# Patient Record
Sex: MALE | Race: OTHER | HISPANIC OR LATINO | Employment: OTHER | URBAN - METROPOLITAN AREA
[De-identification: names, ages, dates, MRNs, and addresses within clinical notes are randomized per-mention and may not be internally consistent; named-entity substitution may affect disease eponyms.]

---

## 2020-09-09 ENCOUNTER — HOSPITAL ENCOUNTER (OUTPATIENT)
Facility: HOSPITAL | Age: 55
Setting detail: OBSERVATION
Discharge: HOME | End: 2020-09-10
Attending: EMERGENCY MEDICINE | Admitting: INTERNAL MEDICINE
Payer: MEDICAID

## 2020-09-09 ENCOUNTER — APPOINTMENT (EMERGENCY)
Dept: RADIOLOGY | Facility: HOSPITAL | Age: 55
End: 2020-09-09
Payer: MEDICAID

## 2020-09-09 DIAGNOSIS — R55 SYNCOPE: ICD-10-CM

## 2020-09-09 DIAGNOSIS — R07.9 CHEST PAIN: Primary | ICD-10-CM

## 2020-09-09 DIAGNOSIS — E86.0 DEHYDRATION: ICD-10-CM

## 2020-09-09 DIAGNOSIS — N17.9 AKI (ACUTE KIDNEY INJURY) (HCC): ICD-10-CM

## 2020-09-09 PROBLEM — R07.89 OTHER CHEST PAIN: Status: ACTIVE | Noted: 2020-09-09

## 2020-09-09 LAB
ALBUMIN SERPL BCP-MCNC: 4.4 G/DL (ref 3.5–5)
ALP SERPL-CCNC: 63 U/L (ref 46–116)
ALT SERPL W P-5'-P-CCNC: 40 U/L (ref 12–78)
ANION GAP SERPL CALCULATED.3IONS-SCNC: 12 MMOL/L (ref 4–13)
AST SERPL W P-5'-P-CCNC: 22 U/L (ref 5–45)
BASOPHILS # BLD AUTO: 0.07 THOUSANDS/ΜL (ref 0–0.1)
BASOPHILS NFR BLD AUTO: 1 % (ref 0–1)
BILIRUB SERPL-MCNC: 0.7 MG/DL (ref 0.2–1)
BUN SERPL-MCNC: 14 MG/DL (ref 5–25)
CALCIUM SERPL-MCNC: 9.6 MG/DL (ref 8.3–10.1)
CHLORIDE SERPL-SCNC: 107 MMOL/L (ref 100–108)
CO2 SERPL-SCNC: 26 MMOL/L (ref 21–32)
CREAT SERPL-MCNC: 2.21 MG/DL (ref 0.6–1.3)
EOSINOPHIL # BLD AUTO: 0.06 THOUSAND/ΜL (ref 0–0.61)
EOSINOPHIL NFR BLD AUTO: 0 % (ref 0–6)
ERYTHROCYTE [DISTWIDTH] IN BLOOD BY AUTOMATED COUNT: 12.6 % (ref 11.6–15.1)
GFR SERPL CREATININE-BSD FRML MDRD: 33 ML/MIN/1.73SQ M
GLUCOSE SERPL-MCNC: 109 MG/DL (ref 65–140)
HCT VFR BLD AUTO: 43 % (ref 36.5–49.3)
HGB BLD-MCNC: 14.4 G/DL (ref 12–17)
IMM GRANULOCYTES # BLD AUTO: 0.1 THOUSAND/UL (ref 0–0.2)
IMM GRANULOCYTES NFR BLD AUTO: 1 % (ref 0–2)
LYMPHOCYTES # BLD AUTO: 1.51 THOUSANDS/ΜL (ref 0.6–4.47)
LYMPHOCYTES NFR BLD AUTO: 10 % (ref 14–44)
MCH RBC QN AUTO: 29.4 PG (ref 26.8–34.3)
MCHC RBC AUTO-ENTMCNC: 33.5 G/DL (ref 31.4–37.4)
MCV RBC AUTO: 88 FL (ref 82–98)
MONOCYTES # BLD AUTO: 0.76 THOUSAND/ΜL (ref 0.17–1.22)
MONOCYTES NFR BLD AUTO: 5 % (ref 4–12)
NEUTROPHILS # BLD AUTO: 12.69 THOUSANDS/ΜL (ref 1.85–7.62)
NEUTS SEG NFR BLD AUTO: 83 % (ref 43–75)
NRBC BLD AUTO-RTO: 0 /100 WBCS
PLATELET # BLD AUTO: 333 THOUSANDS/UL (ref 149–390)
PMV BLD AUTO: 10 FL (ref 8.9–12.7)
POTASSIUM SERPL-SCNC: 3.8 MMOL/L (ref 3.5–5.3)
PROT SERPL-MCNC: 8.2 G/DL (ref 6.4–8.2)
RBC # BLD AUTO: 4.9 MILLION/UL (ref 3.88–5.62)
SODIUM SERPL-SCNC: 145 MMOL/L (ref 136–145)
TROPONIN I SERPL-MCNC: <0.02 NG/ML
TROPONIN I SERPL-MCNC: <0.02 NG/ML
WBC # BLD AUTO: 15.19 THOUSAND/UL (ref 4.31–10.16)

## 2020-09-09 PROCEDURE — 84484 ASSAY OF TROPONIN QUANT: CPT | Performed by: EMERGENCY MEDICINE

## 2020-09-09 PROCEDURE — 99220 PR INITIAL OBSERVATION CARE/DAY 70 MINUTES: CPT | Performed by: INTERNAL MEDICINE

## 2020-09-09 PROCEDURE — 96360 HYDRATION IV INFUSION INIT: CPT

## 2020-09-09 PROCEDURE — 36415 COLL VENOUS BLD VENIPUNCTURE: CPT | Performed by: EMERGENCY MEDICINE

## 2020-09-09 PROCEDURE — 99285 EMERGENCY DEPT VISIT HI MDM: CPT

## 2020-09-09 PROCEDURE — 93005 ELECTROCARDIOGRAM TRACING: CPT

## 2020-09-09 PROCEDURE — 85025 COMPLETE CBC W/AUTO DIFF WBC: CPT | Performed by: EMERGENCY MEDICINE

## 2020-09-09 PROCEDURE — 80053 COMPREHEN METABOLIC PANEL: CPT | Performed by: EMERGENCY MEDICINE

## 2020-09-09 PROCEDURE — 84484 ASSAY OF TROPONIN QUANT: CPT | Performed by: INTERNAL MEDICINE

## 2020-09-09 PROCEDURE — 71045 X-RAY EXAM CHEST 1 VIEW: CPT

## 2020-09-09 PROCEDURE — 99285 EMERGENCY DEPT VISIT HI MDM: CPT | Performed by: EMERGENCY MEDICINE

## 2020-09-09 RX ORDER — SODIUM CHLORIDE 9 MG/ML
125 INJECTION, SOLUTION INTRAVENOUS CONTINUOUS
Status: DISCONTINUED | OUTPATIENT
Start: 2020-09-09 | End: 2020-09-10 | Stop reason: HOSPADM

## 2020-09-09 RX ORDER — ACETAMINOPHEN 325 MG/1
650 TABLET ORAL EVERY 6 HOURS PRN
Status: DISCONTINUED | OUTPATIENT
Start: 2020-09-09 | End: 2020-09-10 | Stop reason: HOSPADM

## 2020-09-09 RX ORDER — PRAVASTATIN SODIUM 40 MG
40 TABLET ORAL EVERY EVENING
Status: DISCONTINUED | OUTPATIENT
Start: 2020-09-09 | End: 2020-09-10 | Stop reason: HOSPADM

## 2020-09-09 RX ORDER — NITROGLYCERIN 0.4 MG/1
0.4 TABLET SUBLINGUAL
Status: DISCONTINUED | OUTPATIENT
Start: 2020-09-09 | End: 2020-09-10 | Stop reason: HOSPADM

## 2020-09-09 RX ORDER — ASPIRIN 81 MG/1
325 TABLET, CHEWABLE ORAL DAILY
Status: DISCONTINUED | OUTPATIENT
Start: 2020-09-09 | End: 2020-09-10 | Stop reason: HOSPADM

## 2020-09-09 RX ADMIN — SODIUM CHLORIDE 1000 ML: 0.9 INJECTION, SOLUTION INTRAVENOUS at 17:38

## 2020-09-09 RX ADMIN — SODIUM CHLORIDE 125 ML/HR: 0.9 INJECTION, SOLUTION INTRAVENOUS at 21:10

## 2020-09-09 RX ADMIN — SODIUM CHLORIDE 125 ML/HR: 0.9 INJECTION, SOLUTION INTRAVENOUS at 19:07

## 2020-09-09 NOTE — H&P
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  I doubt TIA, doubt seizures  Will keep on telemetry to monitor for arrhythmia though it is less likely    I am keeping patient overnight for observation    He is full code    Other chest pain  Assessment & Plan  Patient's EKG shows nonspecific ST-T changes, troponin is normal    Chest pain was likely due to vasovagal syncope    Will monitor on telemetry    Will repeat troponins    Will give patient aspirin and statin today    ALEX (acute kidney injury) [facility])  Assessment & Plan  Patient likely has acute kidney injury from hypotension, vasovagal syncope, dehydration  He had blood work last year and was told that his kidney function was normal at that time  Will hydrate patient with normal saline solution  I placed him on urinary retention protocol since he did admit to nocturia at night  VTE Prophylaxis: ordered    Code Status: as above    Anticipated Length of Stay: as above    Justification for Hospital Stay: see assessment and plan        Chief Complaint:   Passing out and chest pain    History of Present Illness:    Ny Flower is a 48 y o  male who presents with above chief compaint  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 around 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 a m  He felt lightheaded, nauseated, he passed out, Procardia sweat and had an episode of vomiting  He also felt tightness in the chest without radiation that lasted for about 5 minutes  He loss consciousness for couple of seconds and had no postictal confusion, bladder or bowel incontinence  When EMS arrived they found patient's systolic blood pressure in the 90s    Patient was brought to the ER for evaluation    In the ER patient denies any current chest pain, shortness breath, nausea abdominal pain, difficulty urinating, focal weakness or numbness, headache  He was told last year that he has hypercholesterolemia but was also told that his kidney function was normal   On further questioning patient admits to nocturia 2-3 times at night but denies urinary retention symptoms  Patient denies any exertional chest pain, shortness of breath  He does not know his father's medical conditions but his mother has no heart disease  Denies personal history of hypertension, diabetes  Does not smoke or drink alcohol  Review of Systems:    Review of Systems   Constitutional: Negative for fever  HENT: Negative for congestion  Eyes: Negative for visual disturbance  Respiratory: Negative for cough, chest tightness and shortness of breath  Cardiovascular: Positive for chest pain  Negative for palpitations and leg swelling  Gastrointestinal: Negative for abdominal pain, blood in stool and diarrhea  Endocrine: Negative for polydipsia and polyphagia  Genitourinary: Negative for difficulty urinating, dysuria and hematuria  Nocturia 2-3 times   Musculoskeletal: Negative for joint swelling, myalgias and neck stiffness  Skin: Negative for rash  Neurological: Positive for syncope and light-headedness  Negative for weakness, numbness and headaches  Hematological: Negative for adenopathy  Psychiatric/Behavioral: Negative for dysphoric mood  All other systems reviewed and are negative  Past Medical and Surgical History:     Past Medical History:   Diagnosis Date    High cholesterol        History reviewed  No pertinent surgical history      Meds/Allergies:    None       Allergies: No Known Allergies    Social History:     Social History     Substance and Sexual Activity   Alcohol Use Never    Frequency: Never     Social History     Tobacco Use   Smoking Status Never Smoker Smokeless Tobacco Never Used     Social History     Substance and Sexual Activity   Drug Use Never       Family History:    Family History   Problem Relation Age of Onset    Diabetes Mother     No Known Problems Father     Heart disease Maternal Uncle        Physical Exam:     Vitals:   Blood Pressure: 103/58 (09/09/20 1830)  Pulse: 84 (09/09/20 1830)  Temperature: 98 4 °F (36 9 °C) (09/09/20 1715)  Temp Source: Temporal (09/09/20 1715)  Respirations: 18 (09/09/20 1830)  Height: 5' 6" (167 6 cm) (09/09/20 1716)  Weight - Scale: 90 7 kg (200 lb) (09/09/20 1716)  SpO2: 97 % (09/09/20 1830)    Physical Exam  HENT:      Head: Normocephalic  Mouth/Throat:      Mouth: Mucous membranes are dry  Pharynx: No oropharyngeal exudate  Eyes:      Conjunctiva/sclera: Conjunctivae normal    Neck:      Musculoskeletal: Neck supple  Cardiovascular:      Rate and Rhythm: Normal rate and regular rhythm  Heart sounds: No murmur  Pulmonary:      Effort: No respiratory distress  Breath sounds: No wheezing or rales  Abdominal:      General: Bowel sounds are normal       Tenderness: There is no abdominal tenderness  Musculoskeletal:         General: No tenderness  Skin:     General: Skin is warm and dry  Neurological:      Mental Status: He is alert and oriented to person, place, and time  Cranial Nerves: No cranial nerve deficit     Psychiatric:         Mood and Affect: Mood normal              Additional Data:     Lab Results: I personally reviewed them    Results from last 7 days   Lab Units 09/09/20  1738   WBC Thousand/uL 15 19*   HEMOGLOBIN g/dL 14 4   HEMATOCRIT % 43 0   PLATELETS Thousands/uL 333   NEUTROS PCT % 83*   LYMPHS PCT % 10*   MONOS PCT % 5   EOS PCT % 0     Results from last 7 days   Lab Units 09/09/20  1738   POTASSIUM mmol/L 3 8   CHLORIDE mmol/L 107   CO2 mmol/L 26   BUN mg/dL 14   CREATININE mg/dL 2 21*   CALCIUM mg/dL 9 6   ALK PHOS U/L 63   ALT U/L 40   AST U/L 22 Imaging: I personally reviewed them    XR chest 1 view portable   ED Interpretation by Radha Medina MD (09/09 1748)   No acute pulmonary pathology          EKG : I personally reviewed      Talya Elena MD    ** Please Note: This note has been constructed using a voice recognition system   **

## 2020-09-09 NOTE — ED PROVIDER NOTES
History  Chief Complaint   Patient presents with    Hypotension     pt's son reports that they were at a junk yard all day, and patient got hot  drank some water and threw it up  when the called the ambulance they were told that his blood pressure was low (98 systolic)      48year old male presents for evaluation of syncopal episode associated with lightheadedness, chest pressure and a single episode of emesis  Patient is from [place] and had gone to a junk yard with his on when he began feeling lightheaded  EMS was called and checked his blood pressure which was 98 mmHg systolic per patient's son  Patient attempted to drink some water at the scene, but had a single episode of emesis followed by an episode of synope lasting several seconds  He states he had a central chest pressure which has since resolved  Patient reports intermittent episodes of chest pressure for the past 2 weeks, primarily while working  He last saw his PCP a little over a year ago and states he was told his cholesterol was high, but he was not prescribed any medications  Patient denies recent illness or sick contacts  No cough, congestion or shortness of breath  History provided by:  Patient  Syncope   Episode history:  Single  Most recent episode: Today  Timing:  Sporadic  Progression:  Resolved  Chronicity:  New  Context: dehydration    Witnessed: yes    Relieved by:  Nothing  Worsened by:  Nothing  Ineffective treatments: sips of water followed by vomiting    Associated symptoms: chest pain and vomiting    Associated symptoms: no diaphoresis, no fever, no headaches, no nausea, no palpitations, no shortness of breath and no weakness    Chest pain:     Quality: pressure      Severity:  Mild    Onset quality:  Gradual    Duration:  2 weeks    Timing:  Intermittent    Progression:  Partially resolved    Chronicity:  New  Risk factors: no coronary artery disease    Risk factors comment:  History of high cholesterol      None       Past Medical History:   Diagnosis Date    High cholesterol        History reviewed  No pertinent surgical history  Family History   Problem Relation Age of Onset    Diabetes Mother     No Known Problems Father     Heart disease Maternal Uncle      I have reviewed and agree with the history as documented  E-Cigarette/Vaping     E-Cigarette/Vaping Substances     Social History     Tobacco Use    Smoking status: Never Smoker    Smokeless tobacco: Never Used   Substance Use Topics    Alcohol use: Never     Frequency: Never    Drug use: Never       Review of Systems   Constitutional: Negative for appetite change, diaphoresis, fatigue and fever  HENT: Negative for congestion, rhinorrhea and sore throat  Respiratory: Negative for cough, chest tightness and shortness of breath  Cardiovascular: Positive for chest pain and syncope  Negative for palpitations and leg swelling  Gastrointestinal: Positive for vomiting  Negative for abdominal pain, constipation, diarrhea and nausea  Genitourinary: Negative for difficulty urinating, dysuria, frequency and hematuria  Musculoskeletal: Negative for myalgias, neck pain and neck stiffness  Skin: Negative for pallor  Neurological: Positive for syncope and light-headedness  Negative for weakness and headaches  All other systems reviewed and are negative  Physical Exam  Physical Exam  Vitals signs and nursing note reviewed  Constitutional:       General: He is not in acute distress  Appearance: He is well-developed  He is not toxic-appearing or diaphoretic  HENT:      Head: Normocephalic and atraumatic  Mouth/Throat:      Mouth: Mucous membranes are dry  Eyes:      Pupils: Pupils are equal, round, and reactive to light  Neck:      Musculoskeletal: Normal range of motion  Cardiovascular:      Rate and Rhythm: Normal rate and regular rhythm  Heart sounds: Normal heart sounds     Pulmonary:      Effort: Pulmonary effort is normal  Breath sounds: Normal breath sounds  Abdominal:      General: Bowel sounds are normal  There is no distension  Palpations: Abdomen is soft  Tenderness: There is no abdominal tenderness  Skin:     General: Skin is warm and dry           Vital Signs  ED Triage Vitals   Temperature Pulse Respirations Blood Pressure SpO2   09/09/20 1715 09/09/20 1715 09/09/20 1724 09/09/20 1715 09/09/20 1715   98 4 °F (36 9 °C) 92 18 123/54 96 %      Temp Source Heart Rate Source Patient Position - Orthostatic VS BP Location FiO2 (%)   09/09/20 1715 09/09/20 1715 09/09/20 1715 09/09/20 1715 --   Temporal Monitor Lying Left arm       Pain Score       --                  Vitals:    09/09/20 1715 09/09/20 1830   BP: 123/54 103/58   Pulse: 92 84   Patient Position - Orthostatic VS: Lying          Visual Acuity      ED Medications  Medications   sodium chloride 0 9 % infusion (has no administration in time range)   sodium chloride 0 9 % bolus 1,000 mL (1,000 mL Intravenous New Bag 9/9/20 1738)       Diagnostic Studies  Results Reviewed     Procedure Component Value Units Date/Time    Troponin I [[id number]]  (Normal) Collected:  09/09/20 1738    Lab Status:  Final result Specimen:  Blood from Arm, Left Updated:  09/09/20 1811     Troponin I <0 02 ng/mL     Comprehensive metabolic panel [[id number]]  (Abnormal) Collected:  09/09/20 1738    Lab Status:  Final result Specimen:  Blood from Arm, Left Updated:  09/09/20 1808     Sodium 145 mmol/L      Potassium 3 8 mmol/L      Chloride 107 mmol/L      CO2 26 mmol/L      ANION GAP 12 mmol/L      BUN 14 mg/dL      Creatinine 2 21 mg/dL      Glucose 109 mg/dL      Calcium 9 6 mg/dL      AST 22 U/L      ALT 40 U/L      Alkaline Phosphatase 63 U/L      Total Protein 8 2 g/dL      Albumin 4 4 g/dL      Total Bilirubin 0 70 mg/dL      eGFR 33 ml/min/1 73sq m     Narrative:       Rahul guidelines for Chronic Kidney Disease (CKD):     Stage 1 with normal or high GFR (GFR > 90 mL/min/1 73 square meters)    Stage 2 Mild CKD (GFR = 60-89 mL/min/1 73 square meters)    Stage 3A Moderate CKD (GFR = 45-59 mL/min/1 73 square meters)    Stage 3B Moderate CKD (GFR = 30-44 mL/min/1 73 square meters)    Stage 4 Severe CKD (GFR = 15-29 mL/min/1 73 square meters)    Stage 5 End Stage CKD (GFR <15 mL/min/1 73 square meters)  Note: GFR calculation is accurate only with a steady state creatinine    CBC and differential [[id number]]  (Abnormal) Collected:  09/09/20 1738    Lab Status:  Final result Specimen:  Blood from Arm, Left Updated:  09/09/20 1745     WBC 15 19 Thousand/uL      RBC 4 90 Million/uL      Hemoglobin 14 4 g/dL      Hematocrit 43 0 %      MCV 88 fL      MCH 29 4 pg      MCHC 33 5 g/dL      RDW 12 6 %      MPV 10 0 fL      Platelets 702 Thousands/uL      nRBC 0 /100 WBCs      Neutrophils Relative 83 %      Immat GRANS % 1 %      Lymphocytes Relative 10 %      Monocytes Relative 5 %      Eosinophils Relative 0 %      Basophils Relative 1 %      Neutrophils Absolute 12 69 Thousands/µL      Immature Grans Absolute 0 10 Thousand/uL      Lymphocytes Absolute 1 51 Thousands/µL      Monocytes Absolute 0 76 Thousand/µL      Eosinophils Absolute 0 06 Thousand/µL      Basophils Absolute 0 07 Thousands/µL                  XR chest 1 view portable   ED Interpretation by Birdie Felix MD (09/09 1748)   No acute pulmonary pathology                 Procedures  ECG 12 Lead Documentation Only    Date/Time: 9/9/2020 5:19 PM  Performed by: Birdie Felix MD  Authorized by: Birdie Felix MD     Indications / Diagnosis:  Chest pain  ECG reviewed by me, the ED Provider: yes    Patient location:  ED  Previous ECG:     Previous ECG:  Unavailable  Interpretation:     Interpretation: non-specific    Rate:     ECG rate:  91    ECG rate assessment: normal    Rhythm:     Rhythm: sinus rhythm    Ectopy:     Ectopy: none    QRS:     QRS axis:  Normal    QRS intervals:  Normal  Conduction: Conduction: normal    ST segments:     ST segments:  Normal  T waves:     T waves: inverted      Inverted:  AVL             ED Course  ED Course as of Sep 09 1900   Wed Sep 09, 2020   1813 Creatinine(!): 2 21           HEART Risk Score      Most Recent Value   Heart Score Risk Calculator   History  1 Filed at: 09/09/2020 1811   ECG  1 Filed at: 09/09/2020 1811   Age  1 Filed at: 09/09/2020 1811   Risk Factors  1 Filed at: 09/09/2020 1811   Troponin  0 Filed at: 09/09/2020 1811   HEART Score  4 Filed at: 09/09/2020 1811                      SBIRT 22yo+      Most Recent Value   SBIRT (25 yo +)   In order to provide better care to our patients, we are screening all of our patients for alcohol and drug use  Would it be okay to ask you these screening questions? Yes Filed at: 09/09/2020 1722   Initial Alcohol Screen: US AUDIT-C    1  How often do you have a drink containing alcohol?  0 Filed at: 09/09/2020 1722   3a  Male UNDER 65: How often do you have five or more drinks on one occasion? 0 Filed at: 09/09/2020 1722   3b  FEMALE Any Age, or MALE 65+: How often do you have 4 or more drinks on one occassion? 0 Filed at: 09/09/2020 1722   Audit-C Score  0 Filed at: 09/09/2020 1722   ELAINE: How many times in the past year have you    Used an illegal drug or used a prescription medication for non-medical reasons? Never Filed at: 09/09/2020 1722                  MDM  Number of Diagnoses or Management Options  ALEX (acute kidney injury) [facility]): new and requires workup  Chest pain: new and requires workup  Dehydration: new and requires workup  Syncope: new and requires workup  Diagnosis management comments: 48year old male presents for evaluation of syncopal episode associated with intermittent chest pressure for the past 2 weeks  EKG nonspecific  Troponin negative  HEART score 4  Labs significant for ALEX with creatinine 2 21  No known history of kidney disease  1 L NS given in ED and 125 mL/hr infusion ordered   Patient admitted for further evaluation and management  Amount and/or Complexity of Data Reviewed  Clinical lab tests: ordered and reviewed  Tests in the radiology section of CPT®: ordered  Independent visualization of images, tracings, or specimens: yes    Patient Progress  Patient progress: stable      Disposition  Final diagnoses:   Chest pain   ALEX (acute kidney injury) ([facility] )   Dehydration   Syncope     Time reflects when diagnosis was documented in both MDM as applicable and the Disposition within this note     Time User Action Codes Description Comment    9/9/2020  6:26 PM [place] Fine Add [R07 9] Chest pain     9/9/2020  6:26 PM Rita Fine Add [N17 9] ALEX (acute kidney injury) ([facility] )     9/9/2020  6:26 PM Rita Fine Add [E86 0] Dehydration     9/9/2020  6:50 PM [place] Fine Add [R55] Syncope       ED Disposition     ED Disposition Condition Date/Time Comment    Admit Stable Wed Sep 9, 2020  6:50 PM Case was discussed with SHARITA and the patient's admission status was agreed to be Admission Status: observation status to the service of Dr Pancho Jarvis   Follow-up Information    None         Patient's Medications    No medications on file     No discharge procedures on file      PDMP Review     None          ED Provider  Electronically Signed by           Danielle Plasencia MD  09/09/20 [id number]

## 2020-09-09 NOTE — ASSESSMENT & PLAN NOTE
Patient's EKG shows nonspecific ST-T changes, troponin is normal    Chest pain was likely due to vasovagal syncope    Will monitor on telemetry    Will repeat troponins    Will give patient aspirin and statin today

## 2020-09-09 NOTE — ASSESSMENT & PLAN NOTE
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

## 2020-09-09 NOTE — ASSESSMENT & PLAN NOTE
Patient likely has acute kidney injury from hypotension, vasovagal syncope, dehydration  He had blood work last year and was told that his kidney function was normal at that time  Will hydrate patient with normal saline solution  I placed him on urinary retention protocol since he did admit to nocturia at night

## 2020-09-10 VITALS
OXYGEN SATURATION: 96 % | HEART RATE: 69 BPM | BODY MASS INDEX: 37.51 KG/M2 | DIASTOLIC BLOOD PRESSURE: 69 MMHG | HEIGHT: 66 IN | WEIGHT: 233.4 LBS | SYSTOLIC BLOOD PRESSURE: 118 MMHG | TEMPERATURE: 97.8 F | RESPIRATION RATE: 17 BRPM

## 2020-09-10 LAB
ANION GAP SERPL CALCULATED.3IONS-SCNC: 9 MMOL/L (ref 4–13)
ATRIAL RATE: 68 BPM
ATRIAL RATE: 91 BPM
BUN SERPL-MCNC: 13 MG/DL (ref 5–25)
CALCIUM SERPL-MCNC: 7.8 MG/DL (ref 8.3–10.1)
CHLORIDE SERPL-SCNC: 109 MMOL/L (ref 100–108)
CO2 SERPL-SCNC: 25 MMOL/L (ref 21–32)
CREAT SERPL-MCNC: 1.17 MG/DL (ref 0.6–1.3)
GFR SERPL CREATININE-BSD FRML MDRD: 70 ML/MIN/1.73SQ M
GLUCOSE SERPL-MCNC: 94 MG/DL (ref 65–140)
P AXIS: -10 DEGREES
P AXIS: 50 DEGREES
POTASSIUM SERPL-SCNC: 3.6 MMOL/L (ref 3.5–5.3)
PR INTERVAL: 146 MS
PR INTERVAL: 158 MS
QRS AXIS: 35 DEGREES
QRS AXIS: 47 DEGREES
QRSD INTERVAL: 74 MS
QRSD INTERVAL: 80 MS
QT INTERVAL: 322 MS
QT INTERVAL: 402 MS
QTC INTERVAL: 396 MS
QTC INTERVAL: 427 MS
SODIUM SERPL-SCNC: 143 MMOL/L (ref 136–145)
T WAVE AXIS: 84 DEGREES
T WAVE AXIS: 85 DEGREES
TROPONIN I SERPL-MCNC: <0.02 NG/ML
VENTRICULAR RATE: 68 BPM
VENTRICULAR RATE: 91 BPM

## 2020-09-10 PROCEDURE — 80048 BASIC METABOLIC PNL TOTAL CA: CPT | Performed by: INTERNAL MEDICINE

## 2020-09-10 PROCEDURE — 93010 ELECTROCARDIOGRAM REPORT: CPT | Performed by: INTERNAL MEDICINE

## 2020-09-10 PROCEDURE — 84484 ASSAY OF TROPONIN QUANT: CPT | Performed by: INTERNAL MEDICINE

## 2020-09-10 PROCEDURE — 93005 ELECTROCARDIOGRAM TRACING: CPT

## 2020-09-10 PROCEDURE — 99217 PR OBSERVATION CARE DISCHARGE MANAGEMENT: CPT | Performed by: INTERNAL MEDICINE

## 2020-09-10 RX ADMIN — SODIUM CHLORIDE 125 ML/HR: 0.9 INJECTION, SOLUTION INTRAVENOUS at 04:00

## 2020-09-10 RX ADMIN — PRAVASTATIN SODIUM 40 MG: 40 TABLET ORAL at 00:04

## 2020-09-10 RX ADMIN — ASPIRIN 81 MG 324 MG: 81 TABLET ORAL at 10:56

## 2020-09-10 RX ADMIN — ASPIRIN 81 MG 325 MG: 81 TABLET ORAL at 00:04

## 2020-09-10 NOTE — ASSESSMENT & PLAN NOTE
Patient likely has acute kidney injury from hypotension, vasovagal syncope, dehydration      Acute kidney injury resolved with IV hydration,  Creatinine is 1 1 on discharge

## 2020-09-10 NOTE — PLAN OF CARE
Problem: CARDIOVASCULAR - ADULT  Goal: Maintains optimal cardiac output and hemodynamic stability  Description: INTERVENTIONS:  - Monitor I/O, vital signs and rhythm  - Monitor for S/S and trends of decreased cardiac output  - Administer and titrate ordered vasoactive medications to optimize hemodynamic stability  - Assess quality of pulses, skin color and temperature  - Assess for signs of decreased coronary artery perfusion  - Instruct patient to report change in severity of symptoms  Outcome: Progressing  Goal: Absence of cardiac dysrhythmias or at baseline rhythm  Description: INTERVENTIONS:  - Continuous cardiac monitoring, vital signs, obtain 12 lead EKG if ordered  - Administer antiarrhythmic and heart rate control medications as ordered  - Monitor electrolytes and administer replacement therapy as ordered  Outcome: Progressing     Problem: MUSCULOSKELETAL - ADULT  Goal: Maintain or return mobility to safest level of function  Description: INTERVENTIONS:  - Assess patient's ability to carry out ADLs; assess patient's baseline for ADL function and identify physical deficits which impact ability to perform ADLs (bathing, care of mouth/teeth, toileting, grooming, dressing, etc )  - Assess/evaluate cause of self-care deficits   - Assess range of motion  - Assess patient's mobility  - Assess patient's need for assistive devices and provide as appropriate  - Encourage maximum independence but intervene and supervise when necessary  - Involve family in performance of ADLs  - Assess for home care needs following discharge   - Consider OT consult to assist with ADL evaluation and planning for discharge  - Provide patient education as appropriate  Outcome: Progressing  Goal: Maintain proper alignment of affected body part  Description: INTERVENTIONS:  - Support, maintain and protect limb and body alignment  - Provide patient/ family with appropriate education  Outcome: Progressing     Problem: PAIN - ADULT  Goal: Verbalizes/displays adequate comfort level or baseline comfort level  Description: Interventions:  - Encourage patient to monitor pain and request assistance  - Assess pain using appropriate pain scale  - Administer analgesics based on type and severity of pain and evaluate response  - Implement non-pharmacological measures as appropriate and evaluate response  - Consider cultural and social influences on pain and pain management  - Notify physician/advanced practitioner if interventions unsuccessful or patient reports new pain  Outcome: Progressing     Problem: SAFETY ADULT  Goal: Patient will remain free of falls  Description: INTERVENTIONS:  - Assess patient frequently for physical needs  -  Identify cognitive and physical deficits and behaviors that affect risk of falls    -  [place] fall precautions as indicated by assessment   - Educate patient/family on patient safety including physical limitations  - Instruct patient to call for assistance with activity based on assessment  - Modify environment to reduce risk of injury  - Consider OT/PT consult to assist with strengthening/mobility  Outcome: Progressing  Goal: Maintain or return to baseline ADL function  Description: INTERVENTIONS:  -  Assess patient's ability to carry out ADLs; assess patient's baseline for ADL function and identify physical deficits which impact ability to perform ADLs (bathing, care of mouth/teeth, toileting, grooming, dressing, etc )  - Assess/evaluate cause of self-care deficits   - Assess range of motion  - Assess patient's mobility; develop plan if impaired  - Assess patient's need for assistive devices and provide as appropriate  - Encourage maximum independence but intervene and supervise when necessary  - Involve family in performance of ADLs  - Assess for home care needs following discharge   - Consider OT consult to assist with ADL evaluation and planning for discharge  - Provide patient education as appropriate  Outcome: Progressing  Goal: Maintain or return mobility status to optimal level  Description: INTERVENTIONS:  - Assess patient's baseline mobility status (ambulation, transfers, stairs, etc )    - Identify cognitive and physical deficits and behaviors that affect mobility  - Identify mobility aids required to assist with transfers and/or ambulation (gait belt, sit-to-stand, lift, walker, cane, etc )  - [place] fall precautions as indicated by assessment  - Record patient progress and toleration of activity level on Mobility SBAR; progress patient to next Phase/Stage  - Instruct patient to call for assistance with activity based on assessment  - Consider rehabilitation consult to assist with strengthening/weightbearing, etc   Outcome: Progressing     Problem: DISCHARGE PLANNING  Goal: Discharge to home or other facility with appropriate resources  Description: INTERVENTIONS:  - Identify barriers to discharge w/patient and caregiver  - Arrange for needed discharge resources and transportation as appropriate  - Identify discharge learning needs (meds, wound care, etc )  - Arrange for interpretive services to assist at discharge as needed  - Refer to Case Management Department for coordinating discharge planning if the patient needs post-hospital services based on physician/advanced practitioner order or complex needs related to functional status, cognitive ability, or social support system  Outcome: Progressing     Problem: Knowledge Deficit  Goal: Patient/family/caregiver demonstrates understanding of disease process, treatment plan, medications, and discharge instructions  Description: Complete learning assessment and assess knowledge base    Interventions:  - Provide teaching at level of understanding  - Provide teaching via preferred learning methods  Outcome: Progressing

## 2020-09-10 NOTE — DISCHARGE SUMMARY
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[place] in the heat  I observe patient overnight on telemetry that showed no arrhythmia  I hydrate patient with normal saline solution and his hypotension that was present when EMS found patient resolved with hydration  Patient stable condition today for discharge  Lungs are clear to auscultation, heart is regular rhythm, abdomen soft and nontender, strength is normal equal    I spoke with patient's son on the phone and made him aware of the above  I told son that patient should keep well hydrated, follow-up with his family doctor in [facility] next week  Other chest pain  Assessment & Plan  Patient's EKG shows nonspecific ST-T changes, troponin is normal    Patient's chest discomfort was likely due to dehydration, vasovagal syncope  Telemetry showed no arrhythmia, troponins remained negative    I asked patient and patient's son to follow-up with patient's primary care provider next week in [facility] and to have an outpatient stress test for completeness    ALEX (acute kidney injury) [facility])  Assessment & Plan  Patient likely has acute kidney injury from hypotension, vasovagal syncope, dehydration      Acute kidney injury resolved with IV hydration,  Creatinine is 1 1 on discharge              Hospital Course:     Makayla Hall is a 48 y o  male patient who originally presented to the hospital on   Admission Orders (From admission, onward)     Ordered        09/09/20 1851  Place in Observation (expected length of stay for this patient is less than two midnights)  Once                  due to syncope due to vasovagal reaction, hypotension, dehydration    Please see above list of diagnoses and related plan for additional information  Condition at Discharge:  good      Discharge instructions/Information to patient and family:   See after visit summary for information provided to patient and family  Provisions for Follow-Up Care:  See after visit summary for information related to follow-up care and any pertinent home health orders  Disposition:     Home       Discharge Statement:  I spent 25 minutes discharging the patient  This time was spent on the day of discharge  I had direct contact with the patient on the day of discharge  Greater than 50% of the total time was spent examining patient, answering all patient questions, arranging and discussing plan of care with patient as well as directly providing post-discharge instructions  Additional time then spent on discharge activities  Discharge Medications:  See after visit summary for reconciled discharge medications provided to patient and family        ** Please Note: This note has been constructed using a voice recognition system **

## 2020-09-10 NOTE — ASSESSMENT & PLAN NOTE
Patient's EKG shows nonspecific ST-T changes, troponin is normal    Patient's chest discomfort was likely due to dehydration, vasovagal syncope      Telemetry showed no arrhythmia, troponins remained negative    I asked patient and patient's son to follow-up with patient's primary care provider next week in [facility] and to have an outpatient stress test for completeness

## 2020-09-10 NOTE — ASSESSMENT & PLAN NOTE
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

## 2020-09-10 NOTE — UTILIZATION REVIEW
Initial Clinical Review    Admission: Date/Time/Statement:   Admission Orders (From admission, onward)     Ordered        09/09/20 1851  Place in Observation (expected length of stay for this patient is less than two midnights)  Once                   Orders Placed This Encounter   Procedures    Place in Observation (expected length of stay for this patient is less than two midnights)     Standing Status:   Standing     Number of Occurrences:   1     Order Specific Question:   Admitting Physician     Answer:   Franchesca Mcwilliams [[id number]]     Order Specific Question:   Level of Care     Answer:   Med Surg [[id number]]     ED Arrival Information     Expected Arrival Acuity Means of Arrival Escorted By Service Admission Type    - 9/9/2020 16:53 Emergent Walk-In Family Member General Medicine Emergency    Arrival Complaint    LOW BLOOD PRESSURE        Chief Complaint   Patient presents with    Hypotension     pt's son reports that they were at a junk yard all day, and patient got hot  drank some water and threw it up  when the called the ambulance they were told that his blood pressure was low (98 systolic)      Assessment/Plan: this is a 48year old male from home to ED via ems  admitted to observation due to vasovagal syncope/chest pain  Presented due to syncope with lightheadedness, chest pressure and episode of vomiting  Has had intermittent chest pressure last 2 weeks, was at junk yard outside today  On exam mucous membranes dry  Bun 14  Creatinine 2 21 with unknown baseline  Troponin <0 02  Wbc 15 19  In the ED given 1 liter IVF  Plan is continue IVF, telemetry and serial troponin  Started on asa and statin       ED Triage Vitals   Temperature Pulse Respirations Blood Pressure SpO2   09/09/20 1715 09/09/20 1715 09/09/20 1724 09/09/20 1715 09/09/20 1715   98 4 °F (36 9 °C) 92 18 123/54 96 %      Temp Source Heart Rate Source Patient Position - Orthostatic VS BP Location FiO2 (%)   09/09/20 1715 09/09/20 1715 09/09/20 1715 09/09/20 1715 --   Temporal Monitor Lying Left arm       Pain Score       09/09/20 2043       No Pain          Wt Readings from Last 1 Encounters:   09/10/20 106 kg (233 lb 6 4 oz)     Additional Vital Signs:   09/10/20 07:35:44   97 8 °F (36 6 °C)   69   17   118/69   85   96 %          09/09/20 23:28:47   97 8 °F (36 6 °C)   74   18   112/67   82   96 %          09/09/20 1900      82   16   107/64   81   95 %   None (Room air)        Heart Score Risk Calculator   History  1 Filed at: 09/09/2020 1811   ECG  1 Filed at: 09/09/2020 1811   Age  1 Filed at: 09/09/2020 1811   Risk Factors  1 Filed at: 09/09/2020 1811   Troponin  0 Filed at: 09/09/2020 1811   HEART Score  4 Filed at: 09/09/2020 1811     Pertinent Labs/Diagnostic Test Results:   9/9/2020 CxR No acute cardiopulmonary disease    9/9/2020 - EKG - sinus    non specific ST-T changes    Results from last 7 days   Lab Units 09/09/20  1738   WBC Thousand/uL 15 19*   HEMOGLOBIN g/dL 14 4   HEMATOCRIT % 43 0   PLATELETS Thousands/uL 333   NEUTROS ABS Thousands/µL 12 69*     Results from last 7 days   Lab Units 09/10/20  0508 09/09/20  1738   SODIUM mmol/L 143 145   POTASSIUM mmol/L 3 6 3 8   CHLORIDE mmol/L 109* 107   CO2 mmol/L 25 26   ANION GAP mmol/L 9 12   BUN mg/dL 13 14   CREATININE mg/dL 1 17 2 21*   EGFR ml/min/1 73sq m 70 33   CALCIUM mg/dL 7 8* 9 6     Results from last 7 days   Lab Units 09/09/20  1738   AST U/L 22   ALT U/L 40   ALK PHOS U/L 63   TOTAL PROTEIN g/dL 8 2   ALBUMIN g/dL 4 4   TOTAL BILIRUBIN mg/dL 0 70     Results from last 7 days   Lab Units 09/10/20  0508 09/09/20  1738   GLUCOSE RANDOM mg/dL 94 109     Results from last 7 days   Lab Units 09/10/20  0019 09/09/20  2134 09/09/20  1738   TROPONIN I ng/mL <0 02 <0 02 <0 02       ED Treatment:   Medication Administration from 09/09/2020 1652 to 09/09/2020 2023       Date/Time Order Dose Route Action Comments     09/09/2020 1738 sodium chloride 0 9 % bolus 1,000 mL 1,000 mL Intravenous New Bag      09/09/2020 1907 sodium chloride 0 9 % infusion 125 mL/hr Intravenous New Bag         Past Medical History:   Diagnosis Date    High cholesterol      Present on Admission:   Vasovagal syncope   Other chest pain   ALEX (acute kidney injury) ([facility] [address] )      Admitting Diagnosis: Dehydration [E86 0]  Syncope [R55]  Chest pain [R07 9]  ALEX (acute kidney injury) ([facility] [address] ) [N17 9]  Low blood pressure [I95 9]  Age/Sex: 48 y o  male  Admission Orders: 9/9/2020 1851 Observation   Scheduled Medications:  aspirin, 325 mg, Oral, Daily  pravastatin, 40 mg, Oral, QPM      Continuous IV Infusions:  sodium chloride, 125 mL/hr, Intravenous, Continuous      PRN Meds: not used   acetaminophen, 650 mg, Oral, Q6H PRN  nitroglycerin, 0 4 mg, Sublingual, Q5 Min PRN      telemetry    Network Utilization Review Department  [email or web address]  ATTENTION: Please call with any questions or concerns to [phone] and carefully listen to the prompts so that you are directed to the right person  All voicemails are confidential   Nara Cordero all requests for admission clinical reviews, approved or denied determinations and any other requests to dedicated fax number below belonging to the campus where the patient is receiving treatment   List of dedicated fax numbers for the Facilities:  [address] DENIALS (Administrative/Medical Necessity) [phone]   [address] (Maternity/NICU/Pediatrics) [phone]   [facility] [phone]   Phill Mealing [phone]   Rhiannon Belter [phone]   Mechelle Eddy [phone]   [address] [place] [address] [phone]   [facility]  [phone]   [address] [facility] [address]  [address] [facility] [address] [facility] [phone]

## 2020-09-10 NOTE — PLAN OF CARE
Problem: CARDIOVASCULAR - ADULT  Goal: Maintains optimal cardiac output and hemodynamic stability  Description: INTERVENTIONS:  - Monitor I/O, vital signs and rhythm  - Monitor for S/S and trends of decreased cardiac output  - Administer and titrate ordered vasoactive medications to optimize hemodynamic stability  - Assess quality of pulses, skin color and temperature  - Assess for signs of decreased coronary artery perfusion  - Instruct patient to report change in severity of symptoms  Outcome: Adequate for Discharge  Goal: Absence of cardiac dysrhythmias or at baseline rhythm  Description: INTERVENTIONS:  - Continuous cardiac monitoring, vital signs, obtain 12 lead EKG if ordered  - Administer antiarrhythmic and heart rate control medications as ordered  - Monitor electrolytes and administer replacement therapy as ordered  Outcome: Adequate for Discharge     Problem: MUSCULOSKELETAL - ADULT  Goal: Maintain or return mobility to safest level of function  Description: INTERVENTIONS:  - Assess patient's ability to carry out ADLs; assess patient's baseline for ADL function and identify physical deficits which impact ability to perform ADLs (bathing, care of mouth/teeth, toileting, grooming, dressing, etc )  - Assess/evaluate cause of self-care deficits   - Assess range of motion  - Assess patient's mobility  - Assess patient's need for assistive devices and provide as appropriate  - Encourage maximum independence but intervene and supervise when necessary  - Involve family in performance of ADLs  - Assess for home care needs following discharge   - Consider OT consult to assist with ADL evaluation and planning for discharge  - Provide patient education as appropriate  Outcome: Adequate for Discharge  Goal: Maintain proper alignment of affected body part  Description: INTERVENTIONS:  - Support, maintain and protect limb and body alignment  - Provide patient/ family with appropriate education  Outcome: Adequate for Discharge     Problem: PAIN - ADULT  Goal: Verbalizes/displays adequate comfort level or baseline comfort level  Description: Interventions:  - Encourage patient to monitor pain and request assistance  - Assess pain using appropriate pain scale  - Administer analgesics based on type and severity of pain and evaluate response  - Implement non-pharmacological measures as appropriate and evaluate response  - Consider cultural and social influences on pain and pain management  - Notify physician/advanced practitioner if interventions unsuccessful or patient reports new pain  Outcome: Adequate for Discharge     Problem: SAFETY ADULT  Goal: Patient will remain free of falls  Description: INTERVENTIONS:  - Assess patient frequently for physical needs  -  Identify cognitive and physical deficits and behaviors that affect risk of falls    -  [place] fall precautions as indicated by assessment   - Educate patient/family on patient safety including physical limitations  - Instruct patient to call for assistance with activity based on assessment  - Modify environment to reduce risk of injury  - Consider OT/PT consult to assist with strengthening/mobility  Outcome: Adequate for Discharge  Goal: Maintain or return to baseline ADL function  Description: INTERVENTIONS:  -  Assess patient's ability to carry out ADLs; assess patient's baseline for ADL function and identify physical deficits which impact ability to perform ADLs (bathing, care of mouth/teeth, toileting, grooming, dressing, etc )  - Assess/evaluate cause of self-care deficits   - Assess range of motion  - Assess patient's mobility; develop plan if impaired  - Assess patient's need for assistive devices and provide as appropriate  - Encourage maximum independence but intervene and supervise when necessary  - Involve family in performance of ADLs  - Assess for home care needs following discharge   - Consider OT consult to assist with ADL evaluation and planning for discharge  - Provide patient education as appropriate  Outcome: Adequate for Discharge  Goal: Maintain or return mobility status to optimal level  Description: INTERVENTIONS:  - Assess patient's baseline mobility status (ambulation, transfers, stairs, etc )    - Identify cognitive and physical deficits and behaviors that affect mobility  - Identify mobility aids required to assist with transfers and/or ambulation (gait belt, sit-to-stand, lift, walker, cane, etc )  - [place] fall precautions as indicated by assessment  - Record patient progress and toleration of activity level on Mobility SBAR; progress patient to next Phase/Stage  - Instruct patient to call for assistance with activity based on assessment  - Consider rehabilitation consult to assist with strengthening/weightbearing, etc   Outcome: Adequate for Discharge     Problem: DISCHARGE PLANNING  Goal: Discharge to home or other facility with appropriate resources  Description: INTERVENTIONS:  - Identify barriers to discharge w/patient and caregiver  - Arrange for needed discharge resources and transportation as appropriate  - Identify discharge learning needs (meds, wound care, etc )  - Arrange for interpretive services to assist at discharge as needed  - Refer to Case Management Department for coordinating discharge planning if the patient needs post-hospital services based on physician/advanced practitioner order or complex needs related to functional status, cognitive ability, or social support system  Outcome: Adequate for Discharge     Problem: Knowledge Deficit  Goal: Patient/family/caregiver demonstrates understanding of disease process, treatment plan, medications, and discharge instructions  Description: Complete learning assessment and assess knowledge base    Interventions:  - Provide teaching at level of understanding  - Provide teaching via preferred learning methods  Outcome: Adequate for Discharge

## 2020-09-10 NOTE — PROGRESS NOTES
Pastoral Care Progress Note    9/10/2020  Patient: Romeo Ocampo : 1965  Admission Date & Time: 2020 1713  MRN: [id number] [facility]: [id number]                     Chaplaincy Interventions Utilized:   Empowerment: Provided chaplaincy education    Exploration: Explored emotional needs & resources, Explored relational needs & resources and Explored spiritual needs & resources        Relationship Building: Cultivated a relationship of care and support                Chaplaincy Outcomes Achieved:  Unknown outcome

## 2020-09-10 NOTE — PROGRESS NOTES
Pleasant man  Preparing for DC  Oreinted him to pastoral care  NO needs at present for emotional or spiritual support       09/10/20 Ivette [address]   Stress Factors   Patient Stress Factors None identified   Coping Responses   Patient Coping Accepting   Plan of Care   Assessment Completed by: Unit visit